# Patient Record
Sex: FEMALE | Race: WHITE | URBAN - METROPOLITAN AREA
[De-identification: names, ages, dates, MRNs, and addresses within clinical notes are randomized per-mention and may not be internally consistent; named-entity substitution may affect disease eponyms.]

---

## 2021-05-20 ENCOUNTER — OFFICE VISIT (OUTPATIENT)
Dept: URBAN - METROPOLITAN AREA CLINIC 64 | Facility: CLINIC | Age: 76
End: 2021-05-20
Payer: MEDICARE

## 2021-05-20 PROCEDURE — 99204 OFFICE O/P NEW MOD 45 MIN: CPT | Performed by: OPHTHALMOLOGY

## 2021-05-20 PROCEDURE — 67028 INJECTION EYE DRUG: CPT | Performed by: OPHTHALMOLOGY

## 2021-05-20 PROCEDURE — 92134 CPTRZ OPH DX IMG PST SGM RTA: CPT | Performed by: OPHTHALMOLOGY

## 2021-05-20 ASSESSMENT — INTRAOCULAR PRESSURE
OD: 12
OS: 12

## 2021-05-20 NOTE — IMPRESSION/PLAN
Impression: Trib rtnl vein occlusion, left eye, with macular edema: R52.4445. Plan: LEFT eye BRVO w CME from Dr. Jayce Hunter  -- Inferior BRVO w Hmg & CME inferior macula LEFT eye Avastin OS (proc note) RTC 4-6w any available location or Flag FA baseline / plan Avastin OS #2/4 initial
  Future ND? URGED BP control.

## 2021-05-20 NOTE — IMPRESSION/PLAN
Impression: Hypertensive retinopathy, bilateral: H35.033. Plan: Cyndee Kevin is PCP and BP was elevated at last check and again today. High-resolution imaging / retinal examination confirm BRVO large / CARISSA-Retinal Vein Occlusion (CRVO). RETINA will provide vigilant attention to retinal issues. Non-retinal care continue w primary eye team.  PCP  help w systemic risk factors (jessika. HTN) important. Venous occlusion may cause permanent VA change despite attentive retina care.

## 2021-05-20 NOTE — IMPRESSION/PLAN
Impression: Age-related nuclear cataract, right eye: H25.11. Plan: Cataract OD and PCIOL OS (IOL exchange w DR. Babatunde Boston) '21   OK defer gen eye care / MRx / future Ce/IOL OD to excellent hands of Dr. Babatunde Boston

## 2021-07-01 ENCOUNTER — OFFICE VISIT (OUTPATIENT)
Dept: URBAN - METROPOLITAN AREA CLINIC 64 | Facility: CLINIC | Age: 76
End: 2021-07-01
Payer: MEDICARE

## 2021-07-01 DIAGNOSIS — H25.11 AGE-RELATED NUCLEAR CATARACT, RIGHT EYE: ICD-10-CM

## 2021-07-01 PROCEDURE — 92134 CPTRZ OPH DX IMG PST SGM RTA: CPT | Performed by: OPHTHALMOLOGY

## 2021-07-01 PROCEDURE — 92235 FLUORESCEIN ANGRPH MLTIFRAME: CPT | Performed by: OPHTHALMOLOGY

## 2021-07-01 PROCEDURE — 67028 INJECTION EYE DRUG: CPT | Performed by: OPHTHALMOLOGY

## 2021-07-01 NOTE — IMPRESSION/PLAN
Impression: Age-related nuclear cataract OD Plan: Cataract OD and PCIOL OS (IOL exchange w DR. Yohannes Mancera) '21 
  KEEP Gen eye care / MRx / future Ce/IOL OD to excellent hands of Dr. Yohannes Mancera

## 2021-07-01 NOTE — IMPRESSION/PLAN
Impression: Trib rtnl vein occlusion, LEFT BRVO w CME   C15.5123. Plan: hx: [[LEFT eye BRVO w CME from Dr. Tasia Benítez  -- Inferior BRVO w Hmg & CME inferior macula - trial injx Avastin x May '21 -- Improving URGED BP control.]] TODAY - inj Avastin OS (proc note)   Check BP 
   RTC 4-6w any avail.  location ND/inj/OCT plan Avastin OS #3/4 initial - Future exam?

## 2021-07-01 NOTE — IMPRESSION/PLAN
Impression: Hypertensive retinopathy, OU Plan: HTN --Garrison Pablo is PCP and BP was elevated - BRVO large / CARISSA-Retinal Vein Occlusion (CRVO). PCP  help w systemic risk factors (jessika. HTN) important. Venous occlusion may cause permanent VA change despite attentive retina care.   CHECK BP

## 2021-08-12 ENCOUNTER — OFFICE VISIT (OUTPATIENT)
Dept: URBAN - METROPOLITAN AREA CLINIC 64 | Facility: CLINIC | Age: 76
End: 2021-08-12
Payer: MEDICARE

## 2021-08-12 PROCEDURE — 67028 INJECTION EYE DRUG: CPT | Performed by: OPHTHALMOLOGY

## 2021-08-12 PROCEDURE — 92134 CPTRZ OPH DX IMG PST SGM RTA: CPT | Performed by: OPHTHALMOLOGY

## 2021-08-12 ASSESSMENT — INTRAOCULAR PRESSURE
OS: 8
OD: 12

## 2021-08-12 NOTE — IMPRESSION/PLAN
Impression: Trib rtnl vein occlusion, LEFT BRVO w CME   F21.6258. Plan: hx: [[LEFT eye BRVO w CME from Dr. Fredrick Lorenzo  -- Inferior BRVO w Hmg & CME inferior macula - trial injx Avastin x May '21 -- Improving URGED BP control.]] TODAY - inj Avastin OS (proc note)   Check BP every visit    RTC 4-6w DIL/OCT  h/o Avastin OS    future FA?

## 2021-09-14 ENCOUNTER — OFFICE VISIT (OUTPATIENT)
Dept: URBAN - METROPOLITAN AREA CLINIC 64 | Facility: CLINIC | Age: 76
End: 2021-09-14
Payer: MEDICARE

## 2021-09-14 DIAGNOSIS — H34.8320 TRIBUTARY (BRANCH) RETINAL VEIN OCCLUSION, LEFT EYE, WITH MACULAR EDEMA: Primary | ICD-10-CM

## 2021-09-14 PROCEDURE — 99214 OFFICE O/P EST MOD 30 MIN: CPT | Performed by: OPHTHALMOLOGY

## 2021-09-14 PROCEDURE — 67028 INJECTION EYE DRUG: CPT | Performed by: OPHTHALMOLOGY

## 2021-09-14 PROCEDURE — 92134 CPTRZ OPH DX IMG PST SGM RTA: CPT | Performed by: OPHTHALMOLOGY

## 2021-09-14 ASSESSMENT — INTRAOCULAR PRESSURE
OS: 11
OD: 12

## 2021-09-14 NOTE — IMPRESSION/PLAN
Impression: HTN retinopathy OU Plan: hx: Gabriele Mack is PCP w BP elevated at last check and again BDP intake. Hi-res imaging /exam confirm BRVO large / CARISSA-Retinal Vein Occlusion (CRVO). CARE of HTN is KEY . . . H/o Prothrombin 10 mutation may be releated. RVO is unrelated to Ce/IOL surgery. Venous occlusion may cause permanent VA change despite care]] TODAY no occlusion in the other eye.   Repeated exam. . .

## 2021-09-14 NOTE — IMPRESSION/PLAN
Impression: Vein occl, LEFT BRVO w CME injx x May '21  H34.8308. Plan: hx: [[LEFT eye BRVO w CME from Dr. Marlen Fischer  -- Inferior BRVO w Hmg & CME inferior macula - trial injx Avastin x May '21 -- Improving URGED BP control.]] TODAY - inj Avastin OS (proc note)   Check BP every visit     RTC  6w pos COLORS / OCT / plan Avastin OS    Future FA?

## 2021-10-21 ENCOUNTER — OFFICE VISIT (OUTPATIENT)
Dept: URBAN - METROPOLITAN AREA CLINIC 64 | Facility: CLINIC | Age: 76
End: 2021-10-21
Payer: MEDICARE

## 2021-10-21 PROCEDURE — 67028 INJECTION EYE DRUG: CPT | Performed by: OPHTHALMOLOGY

## 2021-10-21 PROCEDURE — 92134 CPTRZ OPH DX IMG PST SGM RTA: CPT | Performed by: OPHTHALMOLOGY

## 2021-10-21 PROCEDURE — 92250 FUNDUS PHOTOGRAPHY W/I&R: CPT | Performed by: OPHTHALMOLOGY

## 2021-10-21 ASSESSMENT — INTRAOCULAR PRESSURE
OS: 7
OD: 7

## 2021-10-21 NOTE — IMPRESSION/PLAN
Impression: Vein occl, LEFT BRVO w CME injx x May '21   injx OS Plan: hx: [[LEFT eye BRVO w CME from Dr. Berto Bowles  -- Inferior BRVO w Hmg & CME inferior macula - trial injx Avastin x May '21 -- Improving URGED BP control.]] TODAY - inj Avastin OS (proc note)   Check BP every visit RTC  6-7w pos FA / plan Avastin OS    Future ND?

## 2021-10-21 NOTE — IMPRESSION/PLAN
Impression: HTN retinopathy OU Plan: hx: Zaria Gregorio is PCP w BP elevated at last check and again BDP intake. Hi-res imaging /exam confirm BRVO large / CARISSA-Retinal Vein Occlusion (CRVO). CARE of HTN is KEY . . . H/o Prothrombin 10 mutation may be releated. RVO is unrelated to Ce/IOL surgery. Venous occlusion may cause permanent VA change despite care]] TODAY repeated exam OD shows AV nicking and no occl OD.

## 2021-10-21 NOTE — IMPRESSION/PLAN
Impression: Age-related nuclear cataract OD Plan: Cataract OD and PCIOL OS (IOL exchange w DR. Sergo Silvestre) '21 
  KEEP Gen eye care / MRx / future Ce/IOL OD to excellent hands of Dr. Sergo Silvestre REPEATED exam shows cataract [place] 2+ OD.

## 2021-12-02 ENCOUNTER — OFFICE VISIT (OUTPATIENT)
Dept: URBAN - METROPOLITAN AREA CLINIC 64 | Facility: CLINIC | Age: 76
End: 2021-12-02
Payer: MEDICARE

## 2021-12-02 PROCEDURE — 67028 INJECTION EYE DRUG: CPT | Performed by: OPHTHALMOLOGY

## 2021-12-02 PROCEDURE — 92235 FLUORESCEIN ANGRPH MLTIFRAME: CPT | Performed by: OPHTHALMOLOGY

## 2021-12-02 PROCEDURE — 92250 FUNDUS PHOTOGRAPHY W/I&R: CPT | Performed by: OPHTHALMOLOGY

## 2021-12-02 PROCEDURE — 92134 CPTRZ OPH DX IMG PST SGM RTA: CPT | Performed by: OPHTHALMOLOGY

## 2021-12-02 ASSESSMENT — INTRAOCULAR PRESSURE
OD: 8
OS: 10

## 2021-12-02 NOTE — IMPRESSION/PLAN
Impression: Vein occl, LEFT BRVO w CME injx x May '21   injx OS Plan: hx: [[LEFT eye BRVO w CME from Dr. Joy Jeffrey  -- Inferior BRVO w Hmg & CME inferior macula - trial injx Avastin x May '21 -- Improving URGED BP control.]] TODAY - inj Avastin OS (proc note)   Check BP  ALWAYS     130/74 on Dec '21      RTC  7-8w ND/inj/pos OCT - plan Avastin OS    Future Exam ?

## 2021-12-09 ENCOUNTER — OFFICE VISIT (OUTPATIENT)
Dept: URBAN - METROPOLITAN AREA CLINIC 64 | Facility: CLINIC | Age: 76
End: 2021-12-09
Payer: MEDICARE

## 2021-12-09 DIAGNOSIS — H26.492 OTHER SECONDARY CATARACT, LEFT EYE: Primary | ICD-10-CM

## 2021-12-09 DIAGNOSIS — H43.812 VITREOUS DEGENERATION, LEFT EYE: ICD-10-CM

## 2021-12-09 PROCEDURE — 99214 OFFICE O/P EST MOD 30 MIN: CPT | Performed by: STUDENT IN AN ORGANIZED HEALTH CARE EDUCATION/TRAINING PROGRAM

## 2021-12-09 PROCEDURE — 99204 OFFICE O/P NEW MOD 45 MIN: CPT | Performed by: STUDENT IN AN ORGANIZED HEALTH CARE EDUCATION/TRAINING PROGRAM

## 2021-12-09 ASSESSMENT — INTRAOCULAR PRESSURE
OD: 11
OS: 12

## 2021-12-09 NOTE — IMPRESSION/PLAN
Impression: Trib rtnl vein occlusion, left eye, with macular edema: J42.4100. Plan: s/p Injecton.   F/u with Dr. Estevan Mays in one month

## 2021-12-09 NOTE — IMPRESSION/PLAN
Impression: Other secondary cataract, left eye: H26.492. Plan: s/p Vivity implant. Discussed procedure, risks, and benefits with patient. Patient elects to proceed. Discussed YAG with Dr Jacquelyn Gomes if possible.

## 2021-12-09 NOTE — IMPRESSION/PLAN
Impression: Vitreous degeneration, left eye: H43.812. Plan: PVD following intravitreal injection OS. Stable today no evidence of RD/RT with binocular indirect exam and [employer] imaging. Recommend follow up with Dr Oscar Braden at next visit for repeat eval. Discussed signs and symptoms of RD and for patient tor eturn if she experiences any.

## 2021-12-09 NOTE — IMPRESSION/PLAN
Impression: Combined forms of age-related cataract, right eye: H25.811.  Plan: Planning for CEIOL with  [facility] when available

## 2022-01-27 ENCOUNTER — OFFICE VISIT (OUTPATIENT)
Dept: URBAN - METROPOLITAN AREA CLINIC 64 | Facility: CLINIC | Age: 77
End: 2022-01-27
Payer: MEDICARE

## 2022-01-27 PROCEDURE — 92134 CPTRZ OPH DX IMG PST SGM RTA: CPT | Performed by: OPHTHALMOLOGY

## 2022-01-27 PROCEDURE — 67028 INJECTION EYE DRUG: CPT | Performed by: OPHTHALMOLOGY

## 2022-01-27 ASSESSMENT — INTRAOCULAR PRESSURE
OD: 9
OS: 9

## 2022-01-27 NOTE — IMPRESSION/PLAN
Impression: Cataract, right eye: H25.811. Plan: Planning for CEIOL with  [facility] when available - send notes to [facility] -- consider YAG for the OS as well in his excellent hands.

## 2022-01-27 NOTE — IMPRESSION/PLAN
Impression: Vein occl, LEFT BRVO w CME injx x May '21   injx OS Plan: hx: [[LEFT eye BRVO w CME from Dr. Jayce Hunter  -- Inferior BRVO w Hmg & CME inferior macula - trial injx Avastin x May '21 -- Improving URGED BP control.]] TODAY - inj Avastin OS (proc note)   Check BP  ALWAYS     130/74 on Dec '21 RTC  8-9w extend DIL OU pos OCT - plan Avastin OS    Future  HRA?

## 2022-03-24 ENCOUNTER — OFFICE VISIT (OUTPATIENT)
Dept: URBAN - METROPOLITAN AREA CLINIC 64 | Facility: CLINIC | Age: 77
End: 2022-03-24
Payer: MEDICARE

## 2022-03-24 DIAGNOSIS — H25.811 COMBINED FORMS OF AGE-RELATED CATARACT, RIGHT EYE: ICD-10-CM

## 2022-03-24 DIAGNOSIS — H35.033 HYPERTENSIVE RETINOPATHY, BILATERAL: ICD-10-CM

## 2022-03-24 PROCEDURE — 67028 INJECTION EYE DRUG: CPT | Performed by: OPHTHALMOLOGY

## 2022-03-24 PROCEDURE — 92134 CPTRZ OPH DX IMG PST SGM RTA: CPT | Performed by: OPHTHALMOLOGY

## 2022-03-24 PROCEDURE — 99214 OFFICE O/P EST MOD 30 MIN: CPT | Performed by: OPHTHALMOLOGY

## 2022-03-24 NOTE — IMPRESSION/PLAN
Impression: HTN retinopathy OU Pt claims GOOD BP now. Plan: hx: Venkatesh Fraire is PCP w BP elevated at last check and again BDP intake. Hi-res imaging /exam confirm BRVO large / CARISSA-Retinal Vein Occlusion (CRVO). CARE of HTN is KEY . . . H/o Prothrombin 10 mutation may be releated. RVO is unrelated to Ce/IOL surgery. Venous occlusion may cause permanent VA change despite care]] TODAY repeated exam OD shows AV nicking and no occl OD.

## 2022-03-24 NOTE — IMPRESSION/PLAN
Impression: Vein occl, LEFT BRVO w CME injx x May '21   injx OS Plan: hx: [[LEFT eye BRVO w CME from Dr. Lian Lara  -- Inferior BRVO w Hmg & CME inferior macula - trial injx Avastin x May '21 -- Improving URGED BP control.]] TODAY - inj Avastin OS (proc note)   Check BP  ALWAYS     130/74 on Dec '21 RTC  9-10w extend pos FA / plan Avastin OS    Future  ND?

## 2022-03-24 NOTE — IMPRESSION/PLAN
Impression: Cataract, right eye: H25.811. Plan: Planning for CEIOL with  [facility] when available - send notes to [facility] -- consider YAG for the OS as well in his excellent hands.

## 2022-06-02 ENCOUNTER — OFFICE VISIT (OUTPATIENT)
Dept: URBAN - METROPOLITAN AREA CLINIC 64 | Facility: CLINIC | Age: 77
End: 2022-06-02
Payer: MEDICARE

## 2022-06-02 DIAGNOSIS — H35.033 HYPERTENSIVE RETINOPATHY, BILATERAL: ICD-10-CM

## 2022-06-02 DIAGNOSIS — H34.8320 TRIBUTARY (BRANCH) RETINAL VEIN OCCLUSION, LEFT EYE, WITH MACULAR EDEMA: Primary | ICD-10-CM

## 2022-06-02 DIAGNOSIS — H43.812 VITREOUS DEGENERATION, LEFT EYE: ICD-10-CM

## 2022-06-02 PROCEDURE — 92134 CPTRZ OPH DX IMG PST SGM RTA: CPT | Performed by: OPHTHALMOLOGY

## 2022-06-02 PROCEDURE — 67028 INJECTION EYE DRUG: CPT | Performed by: OPHTHALMOLOGY

## 2022-06-02 PROCEDURE — 92235 FLUORESCEIN ANGRPH MLTIFRAME: CPT | Performed by: OPHTHALMOLOGY

## 2022-06-02 PROCEDURE — 92250 FUNDUS PHOTOGRAPHY W/I&R: CPT | Performed by: OPHTHALMOLOGY

## 2022-06-02 ASSESSMENT — INTRAOCULAR PRESSURE
OS: 13
OD: 10

## 2022-06-02 NOTE — IMPRESSION/PLAN
Impression: Vein occl, LEFT BRVO w CME injx x May '21   injx OS Plan: hx: [[LEFT eye BRVO w CME from Dr. Jm Abad  -- Inferior BRVO w Hmg & CME inferior macula - trial injx Avastin x May '21 -- Improving URGED BP control.]] TODAY - inj Avastin OS (proc note)   Check BP  ALWAYS     130/74 on Dec '21 RTC SUSPEND. RESOLVED. NO CME recur. RTC 3mo colors / OCT . SUSPEND injx  -- RVO RESOLVED OS.

## 2022-09-08 ENCOUNTER — OFFICE VISIT (OUTPATIENT)
Dept: URBAN - METROPOLITAN AREA CLINIC 64 | Facility: CLINIC | Age: 77
End: 2022-09-08
Payer: MEDICARE

## 2022-09-08 DIAGNOSIS — H34.8320 TRIBUTARY (BRANCH) RETINAL VEIN OCCLUSION, LEFT EYE, WITH MACULAR EDEMA: Primary | ICD-10-CM

## 2022-09-08 DIAGNOSIS — H43.813 VITREOUS DEGENERATION, BILATERAL: ICD-10-CM

## 2022-09-08 DIAGNOSIS — H35.033 HYPERTENSIVE RETINOPATHY, BILATERAL: ICD-10-CM

## 2022-09-08 PROCEDURE — 92250 FUNDUS PHOTOGRAPHY W/I&R: CPT | Performed by: OPHTHALMOLOGY

## 2022-09-08 PROCEDURE — 99214 OFFICE O/P EST MOD 30 MIN: CPT | Performed by: OPHTHALMOLOGY

## 2022-09-08 PROCEDURE — 92134 CPTRZ OPH DX IMG PST SGM RTA: CPT | Performed by: OPHTHALMOLOGY

## 2022-09-08 ASSESSMENT — INTRAOCULAR PRESSURE
OS: 12
OD: 10

## 2022-09-08 NOTE — IMPRESSION/PLAN
Impression: HTN retinopathy OU Pt claims GOOD BP now. Plan: hx: Lindy Leo is PCP w BP elevated at last check and again BDP intake. Hi-res imaging /exam confirm BRVO large / CARISSA-Retinal Vein Occlusion (CRVO). CARE of HTN is KEY . . . H/o Prothrombin 10 mutation may be releated. RVO is unrelated to Ce/IOL surgery. Venous occlusion may cause permanent VA change despite care]] TODAY repeated exam OD shows AV nicking and no occl OD. Pt systolic BP in low 980X today! Encouraged her efforts     (She sometimes comes for her  REBEKAH to get treatments)

## 2022-09-08 NOTE — IMPRESSION/PLAN
Impression: Vitreous degen mild . PCIOL OU Plan: Few floaters will TOLERATE without surgery. Send notes to Dr. Shen Price. Defer care to him now going forward. Send letter.

## 2022-09-08 NOTE — IMPRESSION/PLAN
Impression: Vein occl, LEFT BRVO w CME injx x May '21   injx OS Plan: hx: [[LEFT eye BRVO w CME from Dr. Jasmyn Wheeler  -- Inferior BRVO w Hmg & CME inferior macula - trial injx Avastin x May '21 -- Improving URGED BP control.]] TODAY SUSPENDED -- NO injx . . . Check BP  ALWAYS   - 130/74 on Dec '21 RTC SUSPEND. RESOLVED. NO CME recur. F/u DR. GUADARRAMA 3mo. RTC PRN if any CME w colors / OCT . SUSPEND injx  -- RVO RESOLVED OS.

## 2023-06-05 ENCOUNTER — OFFICE VISIT (OUTPATIENT)
Dept: URBAN - METROPOLITAN AREA CLINIC 64 | Facility: LOCATION | Age: 78
End: 2023-06-05
Payer: MEDICARE

## 2023-06-05 DIAGNOSIS — H34.8320 TRIBUTARY (BRANCH) RETINAL VEIN OCCLUSION, LEFT EYE, WITH MACULAR EDEMA: Primary | ICD-10-CM

## 2023-06-05 DIAGNOSIS — H53.8 OTHER VISUAL DISTURBANCES: ICD-10-CM

## 2023-06-05 PROCEDURE — 92134 CPTRZ OPH DX IMG PST SGM RTA: CPT | Performed by: OPHTHALMOLOGY

## 2023-06-05 PROCEDURE — 99214 OFFICE O/P EST MOD 30 MIN: CPT | Performed by: OPHTHALMOLOGY

## 2023-06-05 PROCEDURE — 92250 FUNDUS PHOTOGRAPHY W/I&R: CPT | Performed by: OPHTHALMOLOGY

## 2023-06-05 ASSESSMENT — INTRAOCULAR PRESSURE
OS: 12
OD: 14

## 2023-06-05 NOTE — IMPRESSION/PLAN
Impression: Other visual disturbances: H53.8. Plan: OD - explained that posterior segment looks great as does IOL, RTC 2-3 weeks nDFEx/OCT nerve/HVF 30-2 AVNI FAST/color vision testing. Consider referral to neuro-ophthalmology.

## 2023-06-05 NOTE — IMPRESSION/PLAN
Impression: Tributary (branch) retinal vein occlusion, left eye, with macular edema: K18.9645. Plan: Stable, observe. CONTINUE F/U w/ Dr Christine Bernal.

## 2023-06-19 ENCOUNTER — OFFICE VISIT (OUTPATIENT)
Dept: URBAN - METROPOLITAN AREA CLINIC 64 | Facility: LOCATION | Age: 78
End: 2023-06-19
Payer: MEDICARE

## 2023-06-19 DIAGNOSIS — H53.8 OTHER VISUAL DISTURBANCES: Primary | ICD-10-CM

## 2023-06-19 PROCEDURE — 92083 EXTENDED VISUAL FIELD XM: CPT | Performed by: OPHTHALMOLOGY

## 2023-06-19 PROCEDURE — 99214 OFFICE O/P EST MOD 30 MIN: CPT | Performed by: OPHTHALMOLOGY

## 2023-06-19 ASSESSMENT — INTRAOCULAR PRESSURE
OS: 14
OD: 12

## 2023-06-19 NOTE — IMPRESSION/PLAN
Impression: Other visual disturbances: H53.8. Plan: Minimal VF defect noted on HVF testing OD today, color vision FULL OD, no RAPD noted today, uncertain as to pt's etiology of visual decline OD - refer to neuro-ophthalmology in [address] next available. CONTINUE ALL GEN EYE CARE W/ DR NUÑEZ [facility].